# Patient Record
Sex: MALE | Race: WHITE | URBAN - METROPOLITAN AREA
[De-identification: names, ages, dates, MRNs, and addresses within clinical notes are randomized per-mention and may not be internally consistent; named-entity substitution may affect disease eponyms.]

---

## 2018-06-19 ENCOUNTER — OFFICE VISIT (OUTPATIENT)
Dept: DERMATOLOGY | Facility: CLINIC | Age: 38
End: 2018-06-19

## 2018-06-19 DIAGNOSIS — L23.5 ALLERGIC DERMATITIS DUE TO OTHER CHEMICAL PRODUCT: Primary | ICD-10-CM

## 2018-06-19 RX ORDER — PREDNISONE 10 MG/1
TABLET ORAL
Qty: 18 TABLET | Refills: 0 | Status: SHIPPED | OUTPATIENT
Start: 2018-06-19 | End: 2018-06-29

## 2018-06-19 RX ORDER — CLOBETASOL PROPIONATE 0.5 MG/G
OINTMENT TOPICAL
Qty: 45 G | Refills: 0 | Status: SHIPPED | OUTPATIENT
Start: 2018-06-19

## 2018-06-19 RX ORDER — CLOBETASOL PROPIONATE 0.5 MG/G
CREAM TOPICAL 2 TIMES DAILY
Qty: 45 G | Refills: 0 | Status: SHIPPED | OUTPATIENT
Start: 2018-06-19

## 2018-06-19 ASSESSMENT — PAIN SCALES - GENERAL: PAINLEVEL: NO PAIN (0)

## 2018-06-19 NOTE — LETTER
2018       RE: David Garcia  [address]  [facility] [address]     Dear Colleague,    Thank you for referring your patient, David Garcia, to the [facility] DERMATOLOGY at [facility]. Please see a copy of my visit note below.    [facility] Dermatology Note      Dermatology Problem List:  1.Dermatitis - allergic contact vs phytophotodermatitis  clobetasol 0.05% ointment/cream, prednisone 30, 20, 10mg taper over 9 days    CC:   Chief Complaint   Patient presents with     Derm Problem     David is here today to be seen for a rash- has been going on for about 4 days.          Encounter Date: 2018    History of Present Illness:  Mr. David Garcia is a 38 year old male who is new to the clinic. He presents with a 3 day hx of a rash on his finger tips. He came back from the [facility] yesterday. He says this same reaction happens to him whenever he goes to tropical places. It happened once in [facility] about 5 years ago, once again when he was in [place] and then now recently [facility]. He thinks it could be salt water. He also states he often reacts to plants outside as well - things like certain pine trees will cause reactions on his skin if they touch it. Currently the rash is not itchy or painful. In the past he has used clobetasol ointment on the sites and this seems to help dry the rash up rather quickly. He thinks his clobetasol is . He also has been put on prednisone for a week or two in the past and this also helps. He is wondering if he should have allergy testing because he feels like his skin is sensitive and reacts to a lot of things easily. He says while he was int [facility] he was the  for his friends/family while there but does not report specific contact with limes or celery. He says he used lime juice but not real limes. He thinks the sun has something to do with this rash he says - this pretty much only happens when he  is spending time outside in the sun. The patient denies painful, itching, tingling or bleeding lesions unless otherwise noted.    Past Medical History:   There is no problem list on file for this patient.    History reviewed. No pertinent past medical history.  History reviewed. No pertinent surgical history.    Social History:  Not obtained today.    Family History:  Not obtained today.    Medications:  No current outpatient prescriptions on file.     No Known Allergies      Review of Systems:  -Constitutional: The patient denies fatigue, fevers, chills, unintended weight loss, and night sweats.  -Skin: As above in HPI. No additional skin concerns.  -GI: no nausea, abdominal pain, vomiting, diarrhea or blood in the stool    Physical exam:  Vitals: There were no vitals taken for this visit.  GEN: This is a well developed, well-nourished male in no acute distress, in a pleasant mood.    SKIN: Sun-exposed skin, which includes the head/face, neck, both arms, both legs, digits, and/or nails was examined.   -small 4-5mm vesicles on the medial and lateral aspects of most of the distal finger tips. He also has a small bullous lesion which is linear, about 7mm in size, on the left proximal forearm. Minimal erythema. No scale no other rashes on the arms and legs or face or neck.   -No other lesions of concern on areas examined.     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 The presentation however fits a little better with allergic contact derm as he is getting nonpruritic vesicles and blisters.    Clobetasol 0.05% ointment for night time    Clobetasol 0.05% cream for day time    Prednisone 30mg po QD x 3 days, 20mg po QD x 3 days then 10mg po QD x 3 days    Follow-up with Dr. Verma as patient would like persue allergy testing as he also reports numerous skin reactions  "to various trees and plants that are almost \"poison ivy\" like.    CC Dr. Alonzo on close of this encounter.  Follow-up prn for new or changing lesions.      Staff Involved:  Staff Only  All risks, benefits and alternatives were discussed with patient.  Patient is in agreement and understands the assessment and plan.  All questions were answered.    Chen Wolf PA-C  [facility]: Phone: [phone], Fax: [phone]            "

## 2018-06-19 NOTE — NURSING NOTE
Dermatology Rooming Note    David Garcia's goals for this visit include:   Chief Complaint   Patient presents with     Derm Problem     juanito is here today to be seen for a rash- has been going on for about 4 days.      Samina Dent MA

## 2018-06-19 NOTE — PROGRESS NOTES
[facility] Dermatology Note      Dermatology Problem List:  1.Dermatitis - allergic contact vs phytophotodermatitis  clobetasol 0.05% ointment/cream, prednisone 30, 20, 10mg taper over 9 days    CC:   Chief Complaint   Patient presents with     Derm Problem     David is here today to be seen for a rash- has been going on for about 4 days.          Encounter Date: 2018    History of Present Illness:  Mr. David Garcia is a 38 year old male who is new to the clinic. He presents with a 3 day hx of a rash on his finger tips. He came back from the [facility] yesterday. He says this same reaction happens to him whenever he goes to tropical places. It happened once in [facility] about 5 years ago, once again when he was in [place] and then now recently [facility]. He thinks it could be salt water. He also states he often reacts to plants outside as well - things like certain pine trees will cause reactions on his skin if they touch it. Currently the rash is not itchy or painful. In the past he has used clobetasol ointment on the sites and this seems to help dry the rash up rather quickly. He thinks his clobetasol is . He also has been put on prednisone for a week or two in the past and this also helps. He is wondering if he should have allergy testing because he feels like his skin is sensitive and reacts to a lot of things easily. He says while he was int [facility] he was the  for his friends/family while there but does not report specific contact with limes or celery. He says he used lime juice but not real limes. He thinks the sun has something to do with this rash he says - this pretty much only happens when he is spending time outside in the sun. The patient denies painful, itching, tingling or bleeding lesions unless otherwise noted.    Past Medical History:   There is no problem list on file for this patient.    History reviewed. No pertinent past medical history.  History  "reviewed. No pertinent surgical history.    Social History:  Not obtained today.    Family History:  Not obtained today.    Medications:  No current outpatient prescriptions on file.     No Known Allergies      Review of Systems:  -Constitutional: The patient denies fatigue, fevers, chills, unintended weight loss, and night sweats.  -Skin: As above in HPI. No additional skin concerns.  -GI: no nausea, abdominal pain, vomiting, diarrhea or blood in the stool    Physical exam:  Vitals: There were no vitals taken for this visit.  GEN: This is a well developed, well-nourished male in no acute distress, in a pleasant mood.    SKIN: Sun-exposed skin, which includes the head/face, neck, both arms, both legs, digits, and/or nails was examined.   -small 4-5mm vesicles on the medial and lateral aspects of most of the distal finger tips. He also has a small bullous lesion which is linear, about 7mm in size, on the left proximal forearm. Minimal erythema. No scale no other rashes on the arms and legs or face or neck.   -No other lesions of concern on areas examined.     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 The presentation however fits a little better with allergic contact derm as he is getting nonpruritic vesicles and blisters.    Clobetasol 0.05% ointment for night time    Clobetasol 0.05% cream for day time    Prednisone 30mg po QD x 3 days, 20mg po QD x 3 days then 10mg po QD x 3 days    Follow-up with Dr. Verma as patient would like persue allergy testing as he also reports numerous skin reactions to various trees and plants that are almost \"poison ivy\" like.    CC Dr. Alonzo on close of this encounter.  Follow-up prn for new or changing lesions.      Staff Involved:  Staff Only  All risks, benefits and alternatives were discussed with patient.  Patient is in " agreement and understands the assessment and plan.  All questions were answered.    Chen Wolf PA-C  [facility]: Phone: [phone], Fax: [phone]

## 2018-06-19 NOTE — MR AVS SNAPSHOT
After Visit Summary   2018    David Garcia    MRN: [id number]           Patient Information     Date Of Birth          1980        Visit Information        Provider Department      2018 9:00 AM Chen Wolf PA-C [facility] Dermatology        Today's Diagnoses     Allergic dermatitis due to other chemical product    -  1       Follow-ups after your visit        Follow-up notes from your care team     Return if symptoms worsen or fail to improve.      Who to contact     Please call your clinic at [phone] to:    Ask questions about your health    Make or cancel appointments    Discuss your medicines    Learn about your test results    Speak to your doctor            Additional Information About Your Visit        MyChart Information     Ste[employer]t is an electronic gateway that provides easy, online access to your medical records. With [employer], you can request a clinic appointment, read your test results, renew a prescription or communicate with your care team.     To sign up for [employer]t visit the website at [email or web address]   You will be asked to enter the access code listed below, as well as some personal information. Please follow the directions to create your username and password.     Your access code is: [email or web address]  Expires: 2018  6:30 AM     Your access code will  in 90 days. If you need help or a new code, please contact your [facility] or call [phone] for assistance.        Care EveryWhere ID     This is your Care EveryWhere ID. This could be used by other organizations to access your [place] medical records  [id number]         Blood Pressure from Last 3 Encounters:   No data found for BP    Weight from Last 3 Encounters:   No data found for Wt              Today, you had the following     No orders found for display         Today's Medication Changes          These changes are accurate as of 18  9:50 AM.  If  you have any questions, ask your nurse or doctor.               Start taking these medicines.        Dose/Directions    * clobetasol 0.05 % cream   Commonly known as:  TEMOVATE   Used for:  Allergic dermatitis due to other chemical product   Started by:  Chen Wolf PA-C        Apply topically 2 times daily   Quantity:  45 g   Refills:  0       * clobetasol 0.05 % ointment   Commonly known as:  TEMOVATE   Used for:  Allergic dermatitis due to other chemical product   Started by:  Chen Wolf PA-C        Use on Saturday / Sunday, twice daily   Quantity:  45 g   Refills:  0       predniSONE 10 MG tablet   Commonly known as:  DELTASONE   Used for:  Allergic dermatitis due to other chemical product   Started by:  Chen Wolf PA-C        Take 1 tablet po TID x 3 days, then 1 tablet po BID x 3, then 1 tablet po QD x 3 days   Quantity:  18 tablet   Refills:  0       * Notice:  This list has 2 medication(s) that are the same as other medications prescribed for you. Read the directions carefully, and ask your doctor or other care provider to review them with you.         Where to get your medicines      These medications were sent to [address] [place] [address] [address] [address]    Hours:  TRANSPLANT PHONE NUMBER [phone] Phone:  [phone]     clobetasol 0.05 % cream    clobetasol 0.05 % ointment    predniSONE 10 MG tablet                Primary Care Provider Fax #    Physician No Ref-Primary [phone]       No address on file        Equal Access to Services     ERICK AKHTAR AH: Hadii aad ku hadasho Soomaali, waaxda luqadaha, qaybta kaalmada adeegyada, waxay abilioin haysheylan seble montaño. So [facility] [phone].    ATENCIÓN: Si habla español, tiene a charles disposición servicios gratuitos de asistencia lingüística. Llame al [phone].    We comply with applicable federal civil rights laws and [place] laws. We  do not discriminate on the basis of race, color, national origin, age, disability, sex, sexual orientation, or gender identity.            Thank you!     Thank you for choosing [facility] DERMATOLOGY  for your care. Our goal is always to provide you with excellent care. Hearing back from our patients is one way we can continue to improve our services. Please take a few minutes to complete the written survey that you may receive in the mail after your visit with us. Thank you!             Your Updated Medication List - Protect others around you: Learn how to safely use, store and throw away your medicines at www.disposemymeds.org.          This list is accurate as of 6/19/18  9:50 AM.  Always use your most recent med list.                   Brand Name Dispense Instructions for use Diagnosis    * clobetasol 0.05 % cream    TEMOVATE    45 g    Apply topically 2 times daily    Allergic dermatitis due to other chemical product       * clobetasol 0.05 % ointment    TEMOVATE    45 g    Use on Saturday / Sunday, twice daily    Allergic dermatitis due to other chemical product       predniSONE 10 MG tablet    DELTASONE    18 tablet    Take 1 tablet po TID x 3 days, then 1 tablet po BID x 3, then 1 tablet po QD x 3 days    Allergic dermatitis due to other chemical product       * Notice:  This list has 2 medication(s) that are the same as other medications prescribed for you. Read the directions carefully, and ask your doctor or other care provider to review them with you.

## 2018-06-25 ENCOUNTER — TELEPHONE (OUTPATIENT)
Dept: DERMATOLOGY | Facility: CLINIC | Age: 38
End: 2018-06-25

## 2018-06-25 NOTE — TELEPHONE ENCOUNTER
June 25, 2018              Gloria Sebastian LPN    No chief complaint on file.      Pre-Visit Documentation: David Garcia is a 38 year old male  Coming in for allergy consult.     ASSESSMENT / PLAN:  No diagnosis found.      Left voicemail regarding appointment on 6/29/18 at 1400. Call back number provided for questions or rescheduling.

## 2018-06-29 ENCOUNTER — OFFICE VISIT (OUTPATIENT)
Dept: DERMATOLOGY | Facility: CLINIC | Age: 38
End: 2018-06-29

## 2018-06-29 DIAGNOSIS — L23.89 ALLERGIC CONTACT DERMATITIS DUE TO OTHER AGENTS: Primary | ICD-10-CM

## 2018-06-29 ASSESSMENT — PAIN SCALES - GENERAL: PAINLEVEL: NO PAIN (0)

## 2018-06-29 NOTE — LETTER
6/29/2018       RE: David Garcia  [address]  [facility] [address]     Dear Colleague,    Thank you for referring your patient, David Garcia, to the [facility] DERMATOLOGY at [facility]. Please see a copy of my visit note below.    [facility] Dermatology Note      Dermatology Problem List:    Specialty Problems     None          CC:   Derm Problem (David is here to discus potential allergy testing. He states that he is interesting in a possible reaction to salt water. )        Encounter Date: Jun 29, 2018    History of Present Illness:  Mr. David Garcia is a 38 year old male who presents as a referral from Self.    [place] [facility] with [place] water patient develops dyshidrotic lesions on fingers. Uses there sunscreens    Past Medical History:   There is no problem list on file for this patient.    History reviewed. No pertinent past medical history.    Allergy History:   No Known Allergies  In prick tests positive to pets and tree and grass pollens. With Flonase and Allegra under control. Runny nose and scratchy throat.  No atopic dermatitis  About 1 year ago went to tanning go and put some fragrance Lotion on the body and within 1 day eczematous, itchy lesions    Social History:  The patient works as a real estater. Patient has the following hobbies or non-occupational exposure: biking, snorkeling, swimming in salt water     reports that he has never smoked. He has never used smokeless tobacco.      Family History:  History reviewed. No pertinent family history.    Medications:  Current Outpatient Prescriptions   Medication Sig Dispense Refill     clobetasol (TEMOVATE) 0.05 % cream Apply topically 2 times daily 45 g 0     clobetasol (TEMOVATE) 0.05 % ointment Use on Saturday / Sunday, twice daily 45 g 0           Review of Systems:  -As per HPI  -Constitutional: The patient denies fatigue, fevers, chills,  unintended weight loss, and night sweats.  -HEENT: Patient denies nonhealing oral sores.  -Skin: As above in HPI. No additional skin concerns.    Physical exam:  Vitals: There were no vitals taken for this visit.  GEN: This is a well developed, well-nourished male in no acute distress, in a pleasant mood.    SKIN: Focused examination of the hands and feet was performed.  In the moment just some pulpite seche on both hands and elbow left some postinflammatory hyperpigmentation. From a photo (10 days ago) after [place] on the fingers absolutely typical dyshidrotic vesicles on the hands.     -No other lesions of concern on areas examined.     Impression/Plan:    ==> recurrent dyshidrotic hand eczema    DDx allergic contact eczema, atopic dermatitis, hyperhidrosis, Mykid (but no signs for fungal infection on feet)    --> plan patch tests to exclude allergic contact dermatitis      Order for PATCH TESTS      [] Inpatient / [facility]....   Bed ....  [x] Outpatient    Skin Atopy           :       [x] Yes   [] No  Rhinitis/Sinusitis :       [x] Yes   [] No  Allergic Asthma   :       [] Yes   [x] No  Leg ulcers            :       [] Yes   [x] No  Hand eczema       :        [x] Yes   [] No    Leading hand :  [x] R [] L               [] Ambidextrous                      Reason for tests (Suspected allergy): dyshidrotic hand eczema  Known previous allergies: fragrances? photoaggravation with tanning go?    [x] Standardized panels  [x] Standard panel (40 tests)  [x] Preservatives & Antimicrobials (31 tests)  [x] Emulsifiers & Additives (25 tests)   [] Perfumes/Flavours & Plants (25 tests)  [] Hairdresser panel (12 tests)  [] Rubber Chemicals (22 tests)  [] Plastics (26 tests)  [] Colorants/Dyes/Food additives (20 tests)  [] Metals (implants/dental) (23 tests)  [] Local anaesthetics/NSAIDs (12 tests)  [] Antibiotics & Antimycotics (14 tests)   [] Corticosteroids (15 tests)   [x] Photopatch test (32 tests)   [] others: ...    []  Patient's own products: ...    DO NOT test if chemical or biological identity is unknown!     always ask from patient the product information and safety sheets (MSDS)     [] Patient needs consultation with Allergy team  [x] Tests discussed with Allergy team (can have direct appointment for patch tests)      Follow-up for patch tests    Again, thank you for allowing me to participate in the care of your patient.      Sincerely,    Shad Verma MD

## 2018-06-29 NOTE — NURSING NOTE
Dermatology Rooming Note    David Garcia's goals for this visit include:   Chief Complaint   Patient presents with     Derm Problem     David is here to discus potential allergy testing. He states that he is interesting in a possible reaction to salt water.      Gloria Sebastian LPN

## 2018-06-29 NOTE — PATIENT INSTRUCTIONS
Allergen Patch Tests    What are allergen patch tests?    Done to look for skin allergies that may be causing rashes and irritation    Three test panels with small amounts of the most common substances that cause skin allergies are taped onto your skin (usually on the back).    If you are allergic, there will be a small (about 1 inch by 1 inch) area of irritation where it was placed on your skin.    The substances are numbered, so it is easy to tell what is causing a skin reaction.     What should I do after the tests are placed?    Do not wash the test area. You may take a bath, but you may not take a shower. Keep the area dry.    Do not exercise or do activities that may cause sweating or stretch the skin.    Put on more tape if the test panels start to come off.  Leave the test panels in place for 48 hours.    You can remove the panels after 48 hours. Place the panels on wax paper or in a plastic bag.    If the black marker applied by the nurse fades, you can use a dark pen to sunny around the panel sites.    Check for redness, swelling, rash, or blisters 30 minutes after you take off the panels.    Write down the numbers of the sites that are:  o  Red, swollen, blistered, itching, or burning     Bring the test panels with you to your next doctor visit.    What can I expect?    Itching or burning at the test site may happen if you are allergic.  Do not rub or scratch. If itching or burning is not tolerable, call the doctor.    Your first follow-up visit will be 2-3 days after patch test placement. We will see you again 7 days after patch test placement.    Reactions can sometimes occur 1 to 2 weeks after the tests are applied. If this happens, call your doctor.

## 2018-06-29 NOTE — LETTER
Date:July 2, 2018      Provider requested that no letter be sent. Do not send.       [facility] Health Information

## 2018-06-29 NOTE — PROGRESS NOTES
[facility] Dermatology Note      Dermatology Problem List:    Specialty Problems     None          CC:   Derm Problem (David is here to discus potential allergy testing. He states that he is interesting in a possible reaction to salt water. )        Encounter Date: Jun 29, 2018    History of Present Illness:  Mr. David Garcia is a 38 year old male who presents as a referral from Self.    [place] patient develops dyshidrotic lesions on fingers. Uses there sunscreens    Past Medical History:   There is no problem list on file for this patient.    History reviewed. No pertinent past medical history.    Allergy History:   No Known Allergies  In prick tests positive to pets and tree and grass pollens. With Flonase and Allegra under control. Runny nose and scratchy throat.  No atopic dermatitis  About 1 year ago went to tanning [place] and put some fragrance Lotion on the body and within 1 day eczematous, itchy lesions    Social History:  The patient works as a real estater. Patient has the following hobbies or non-occupational exposure: biking, snorkeling, swimming in salt water     reports that he has never smoked. He has never used smokeless tobacco.      Family History:  History reviewed. No pertinent family history.    Medications:  Current Outpatient Prescriptions   Medication Sig Dispense Refill     clobetasol (TEMOVATE) 0.05 % cream Apply topically 2 times daily 45 g 0     clobetasol (TEMOVATE) 0.05 % ointment Use on Saturday / Sunday, twice daily 45 g 0           Review of Systems:  -As per HPI  -Constitutional: The patient denies fatigue, fevers, chills, unintended weight loss, and night sweats.  -HEENT: Patient denies nonhealing oral sores.  -Skin: As above in HPI. No additional skin concerns.    Physical exam:  Vitals: There were no vitals taken for this visit.  GEN: This is a well developed, well-nourished male in no  acute distress, in a pleasant mood.    SKIN: Focused examination of the hands and feet was performed.  In the moment just some pulpite seche on both hands and elbow left some postinflammatory hyperpigmentation. From a photo (10 days ago) after [place] on the fingers absolutely typical dyshidrotic vesicles on the hands.     -No other lesions of concern on areas examined.     Impression/Plan:    ==> recurrent dyshidrotic hand eczema    DDx allergic contact eczema, atopic dermatitis, hyperhidrosis, Mykid (but no signs for fungal infection on feet)    --> plan patch tests to exclude allergic contact dermatitis      Order for PATCH TESTS      [] Inpatient / [facility]....   Bed ....  [x] Outpatient    Skin Atopy           :       [x] Yes   [] No  Rhinitis/Sinusitis :       [x] Yes   [] No  Allergic Asthma   :       [] Yes   [x] No  Leg ulcers            :       [] Yes   [x] No  Hand eczema       :        [x] Yes   [] No    Leading hand :  [x] R [] L               [] Ambidextrous                      Reason for tests (Suspected allergy): dyshidrotic hand eczema  Known previous allergies: fragrances? photoaggravation with tanning go?    [x] Standardized panels  [x] Standard panel (40 tests)  [x] Preservatives & Antimicrobials (31 tests)  [x] Emulsifiers & Additives (25 tests)   [] Perfumes/Flavours & Plants (25 tests)  [] Hairdresser panel (12 tests)  [] Rubber Chemicals (22 tests)  [] Plastics (26 tests)  [] Colorants/Dyes/Food additives (20 tests)  [] Metals (implants/dental) (23 tests)  [] Local anaesthetics/NSAIDs (12 tests)  [] Antibiotics & Antimycotics (14 tests)   [] Corticosteroids (15 tests)   [x] Photopatch test (32 tests)   [] others: ...    [] Patient's own products: ...    DO NOT test if chemical or biological identity is unknown!     always ask from patient the product information and safety sheets (MSDS)     [] Patient needs consultation with Allergy team  [x] Tests discussed with Allergy team (can have direct  appointment for patch tests)      Follow-up for patch tests

## 2019-05-06 ENCOUNTER — TELEPHONE (OUTPATIENT)
Dept: DERMATOLOGY | Facility: CLINIC | Age: 39
End: 2019-05-06

## 2019-05-06 NOTE — TELEPHONE ENCOUNTER
Patient refusing patch testing at this time. He states that his insurance is attempting to charge him for the testing he has not had yet. Informed patient I would attempt to reach out to our insurance team to see if there is anything on our end that can be done to clear this up.